# Patient Record
Sex: FEMALE | Race: OTHER | NOT HISPANIC OR LATINO
[De-identification: names, ages, dates, MRNs, and addresses within clinical notes are randomized per-mention and may not be internally consistent; named-entity substitution may affect disease eponyms.]

---

## 2022-03-07 ENCOUNTER — TRANSCRIPTION ENCOUNTER (OUTPATIENT)
Age: 72
End: 2022-03-07

## 2022-03-07 NOTE — ASU PATIENT PROFILE, ADULT - FALL HARM RISK - FACTORS NURSING JUDGEMENT
[email or web address],[email or web address] No

## 2022-03-07 NOTE — ASU PATIENT PROFILE, ADULT - FALL HARM RISK - UNIVERSAL INTERVENTIONS
Bed in lowest position, wheels locked, appropriate side rails in place/Call bell, personal items and telephone in reach/Instruct patient to call for assistance before getting out of bed or chair/Non-slip footwear when patient is out of bed/[place] to call system/Physically safe environment - no spills, clutter or unnecessary equipment/Purposeful Proactive Rounding/Room/bathroom lighting operational, light cord in reach

## 2022-03-08 ENCOUNTER — INPATIENT (INPATIENT)
Facility: HOSPITAL | Age: 72
LOS: 1 days | Discharge: HOME | DRG: 742 | End: 2022-03-10
Attending: OBSTETRICS & GYNECOLOGY | Admitting: OBSTETRICS & GYNECOLOGY
Payer: MEDICARE

## 2022-03-08 ENCOUNTER — RESULT REVIEW (OUTPATIENT)
Age: 72
End: 2022-03-08

## 2022-03-08 VITALS
OXYGEN SATURATION: 98 % | HEIGHT: 62 IN | HEART RATE: 75 BPM | RESPIRATION RATE: 16 BRPM | SYSTOLIC BLOOD PRESSURE: 150 MMHG | WEIGHT: 249.12 LBS | TEMPERATURE: 97 F | DIASTOLIC BLOOD PRESSURE: 87 MMHG

## 2022-03-08 DIAGNOSIS — Z96.653 PRESENCE OF ARTIFICIAL KNEE JOINT, BILATERAL: Chronic | ICD-10-CM

## 2022-03-08 DIAGNOSIS — H26.9 UNSPECIFIED CATARACT: Chronic | ICD-10-CM

## 2022-03-08 LAB
BLD GP AB SCN SERPL QL: NEGATIVE — SIGNIFICANT CHANGE UP
GLUCOSE BLDC GLUCOMTR-MCNC: 122 MG/DL — HIGH (ref 70–99)
RH IG SCN BLD-IMP: POSITIVE — SIGNIFICANT CHANGE UP

## 2022-03-08 PROCEDURE — 88302 TISSUE EXAM BY PATHOLOGIST: CPT | Mod: 26

## 2022-03-08 PROCEDURE — 88307 TISSUE EXAM BY PATHOLOGIST: CPT | Mod: 26

## 2022-03-08 DEVICE — TVT OBTURATOR SYSTEM: Type: IMPLANTABLE DEVICE | Status: FUNCTIONAL

## 2022-03-08 RX ORDER — OXYCODONE HYDROCHLORIDE 5 MG/1
5 TABLET ORAL ONCE
Refills: 0 | Status: DISCONTINUED | OUTPATIENT
Start: 2022-03-08 | End: 2022-03-08

## 2022-03-08 RX ORDER — IBUPROFEN 200 MG
600 TABLET ORAL EVERY 6 HOURS
Refills: 0 | Status: COMPLETED | OUTPATIENT
Start: 2022-03-08

## 2022-03-08 RX ORDER — GABAPENTIN 400 MG/1
0 CAPSULE ORAL
Qty: 0 | Refills: 0 | DISCHARGE

## 2022-03-08 RX ORDER — HEPARIN SODIUM 5000 [USP'U]/ML
5000 INJECTION INTRAVENOUS; SUBCUTANEOUS ONCE
Refills: 0 | Status: COMPLETED | OUTPATIENT
Start: 2022-03-08 | End: 2022-03-08

## 2022-03-08 RX ORDER — CELECOXIB 200 MG/1
400 CAPSULE ORAL ONCE
Refills: 0 | Status: COMPLETED | OUTPATIENT
Start: 2022-03-08 | End: 2022-03-08

## 2022-03-08 RX ORDER — SENNA PLUS 8.6 MG/1
1 TABLET ORAL ONCE
Refills: 0 | Status: DISCONTINUED | OUTPATIENT
Start: 2022-03-08 | End: 2022-03-10

## 2022-03-08 RX ORDER — ACETAMINOPHEN 500 MG
1000 TABLET ORAL ONCE
Refills: 0 | Status: COMPLETED | OUTPATIENT
Start: 2022-03-08 | End: 2022-03-08

## 2022-03-08 RX ORDER — SODIUM CHLORIDE 9 MG/ML
1000 INJECTION, SOLUTION INTRAVENOUS
Refills: 0 | Status: DISCONTINUED | OUTPATIENT
Start: 2022-03-08 | End: 2022-03-09

## 2022-03-08 RX ORDER — BUPIVACAINE 13.3 MG/ML
20 INJECTION, SUSPENSION, LIPOSOMAL INFILTRATION ONCE
Refills: 0 | Status: DISCONTINUED | OUTPATIENT
Start: 2022-03-08 | End: 2022-03-10

## 2022-03-08 RX ORDER — HYDROMORPHONE HYDROCHLORIDE 2 MG/ML
0.5 INJECTION INTRAMUSCULAR; INTRAVENOUS; SUBCUTANEOUS
Refills: 0 | Status: DISCONTINUED | OUTPATIENT
Start: 2022-03-08 | End: 2022-03-08

## 2022-03-08 RX ORDER — ASPIRIN/CALCIUM CARB/MAGNESIUM 324 MG
0 TABLET ORAL
Qty: 0 | Refills: 0 | DISCHARGE

## 2022-03-08 RX ORDER — SIMETHICONE 80 MG/1
80 TABLET, CHEWABLE ORAL ONCE
Refills: 0 | Status: DISCONTINUED | OUTPATIENT
Start: 2022-03-08 | End: 2022-03-10

## 2022-03-08 RX ORDER — HYDROMORPHONE HYDROCHLORIDE 2 MG/ML
0.5 INJECTION INTRAMUSCULAR; INTRAVENOUS; SUBCUTANEOUS ONCE
Refills: 0 | Status: DISCONTINUED | OUTPATIENT
Start: 2022-03-08 | End: 2022-03-08

## 2022-03-08 RX ORDER — ONDANSETRON 8 MG/1
4 TABLET, FILM COATED ORAL EVERY 6 HOURS
Refills: 0 | Status: DISCONTINUED | OUTPATIENT
Start: 2022-03-08 | End: 2022-03-10

## 2022-03-08 RX ORDER — ACETAMINOPHEN 500 MG
1000 TABLET ORAL EVERY 6 HOURS
Refills: 0 | Status: COMPLETED | OUTPATIENT
Start: 2022-03-08

## 2022-03-08 RX ORDER — BENZOCAINE AND MENTHOL 5; 1 G/100ML; G/100ML
1 LIQUID ORAL EVERY 4 HOURS
Refills: 0 | Status: DISCONTINUED | OUTPATIENT
Start: 2022-03-08 | End: 2022-03-10

## 2022-03-08 RX ORDER — ACETAMINOPHEN 500 MG
1000 TABLET ORAL EVERY 6 HOURS
Refills: 0 | Status: COMPLETED | OUTPATIENT
Start: 2022-03-08 | End: 2022-03-09

## 2022-03-08 RX ORDER — KETOROLAC TROMETHAMINE 30 MG/ML
15 SYRINGE (ML) INJECTION EVERY 6 HOURS
Refills: 0 | Status: DISCONTINUED | OUTPATIENT
Start: 2022-03-08 | End: 2022-03-09

## 2022-03-08 RX ADMIN — Medication 1000 MILLIGRAM(S): at 18:29

## 2022-03-08 RX ADMIN — CELECOXIB 400 MILLIGRAM(S): 200 CAPSULE ORAL at 06:54

## 2022-03-08 RX ADMIN — HYDROMORPHONE HYDROCHLORIDE 0.5 MILLIGRAM(S): 2 INJECTION INTRAMUSCULAR; INTRAVENOUS; SUBCUTANEOUS at 14:00

## 2022-03-08 RX ADMIN — Medication 1000 MILLIGRAM(S): at 23:14

## 2022-03-08 RX ADMIN — Medication 15 MILLIGRAM(S): at 18:29

## 2022-03-08 RX ADMIN — HEPARIN SODIUM 5000 UNIT(S): 5000 INJECTION INTRAVENOUS; SUBCUTANEOUS at 06:30

## 2022-03-08 RX ADMIN — HYDROMORPHONE HYDROCHLORIDE 0.5 MILLIGRAM(S): 2 INJECTION INTRAMUSCULAR; INTRAVENOUS; SUBCUTANEOUS at 12:24

## 2022-03-08 RX ADMIN — Medication 15 MILLIGRAM(S): at 17:29

## 2022-03-08 RX ADMIN — BENZOCAINE AND MENTHOL 1 LOZENGE: 5; 1 LIQUID ORAL at 22:29

## 2022-03-08 RX ADMIN — Medication 1000 MILLIGRAM(S): at 06:54

## 2022-03-08 RX ADMIN — HYDROMORPHONE HYDROCHLORIDE 0.5 MILLIGRAM(S): 2 INJECTION INTRAMUSCULAR; INTRAVENOUS; SUBCUTANEOUS at 13:45

## 2022-03-08 RX ADMIN — Medication 1000 MILLIGRAM(S): at 17:29

## 2022-03-08 RX ADMIN — Medication 15 MILLIGRAM(S): at 23:14

## 2022-03-08 RX ADMIN — Medication 1000 MILLIGRAM(S): at 14:00

## 2022-03-08 RX ADMIN — HYDROMORPHONE HYDROCHLORIDE 0.5 MILLIGRAM(S): 2 INJECTION INTRAMUSCULAR; INTRAVENOUS; SUBCUTANEOUS at 12:39

## 2022-03-08 RX ADMIN — Medication 400 MILLIGRAM(S): at 13:45

## 2022-03-08 NOTE — PACU DISCHARGE NOTE - COMMENTS
Patient hemodynamically stable. Pt pain controlled with regimen ordered for. Moving all extremities. Adequate urine output from 16FR nance. Vaginal packing in place, scant bleeding to derick pad. Pt going to tele for inverted t wave in EKG and sleep apnea. Cleared by anesthesiologist to go to floor. Report given to ALBERT Cm.

## 2022-03-08 NOTE — BRIEF OPERATIVE NOTE - OPERATION/FINDINGS
On pelvic exam On pelvic exam 6 week size mobile anteverted uterus palpated with small fibroid palpated, no adnexal masses. TVH completed, tubes and ovaries left, but ovaries not visualized. Anterior repair completed with vicryl sutures. Sacrospinous ligament fixation done. TVOT completed. Hemostasis excellent. Vaginal mucosa reapproximated with 2-0 vicryl. Vaginal packing saoked in betadine placed. Linares left in place

## 2022-03-08 NOTE — BRIEF OPERATIVE NOTE - NSICDXBRIEFPOSTOP_GEN_ALL_CORE_FT
POST-OP DIAGNOSIS:  Cystocele or rectocele with uterine prolapse 08-Mar-2022 08:38:04  Amparo Early

## 2022-03-08 NOTE — PROGRESS NOTE ADULT - SUBJECTIVE AND OBJECTIVE BOX
GYN Post Op Check     Patient seen at bedside. Intermittent inverted T waves noted on TELE.  Patient reports feeling tired but denies any chest pain, SOB, palpitations, dizziness. Endorses slight vaginal pain but well managed with pain medication.   Has not yet attempted PO.   No OOB yet.  Linares in place.    Denies CP, palpitations, SOB, fever, chills, nausea, vomiting.    Vital Signs Last 24 Hrs  T(C): 36.3 (08 Mar 2022 12:00), Max: 36.3 (08 Mar 2022 07:00)  T(F): 97.4 (08 Mar 2022 12:00), Max: 97.4 (08 Mar 2022 12:00)  HR: 81 (08 Mar 2022 14:30) (67 - 90)  BP: 140/71 (08 Mar 2022 14:13) (128/62 - 150/87)  BP(mean): 96 (08 Mar 2022 14:13) (87 - 105)  RR: 10 (08 Mar 2022 14:30) (10 - 18)  SpO2: 100% (08 Mar 2022 14:30) (98% - 100%)    Physical Exam:  Gen: No Acute Distress  Pulm: Clear to auscultation bilaterally  CV: normal s1/s2, RRR, no m/r/g  GI: soft, appropriately tender, nondistended, no rebound, no guarding.  incision c/d/i, dermabond in place  : Linares in place  Ext: SCDs in place, wnl    I&O's Summary    08 Mar 2022 07:01  -  08 Mar 2022 14:48  --------------------------------------------------------  IN: 280 mL / OUT: 175 mL / NET: 105 mL      MEDICATIONS  (STANDING):  acetaminophen     Tablet .. 1000 milliGRAM(s) Oral every 6 hours  BUpivacaine liposome 1.3% Injectable (no eMAR) 20 milliLiter(s) Local Injection Once  HYDROmorphone  Injectable 0.5 milliGRAM(s) IV Push every 15 minutes  ketorolac   Injectable 15 milliGRAM(s) IV Push every 6 hours  lactated ringers. 1000 milliLiter(s) (140 mL/Hr) IV Continuous <Continuous>    MEDICATIONS  (PRN):  acetaminophen     Tablet .. 1000 milliGRAM(s) Oral every 6 hours PRN Mild Pain (1 - 3)  ibuprofen  Tablet. 600 milliGRAM(s) Oral every 6 hours PRN Mild Pain (1 - 3)  ondansetron Injectable 4 milliGRAM(s) IV Push every 6 hours PRN Nausea and/or Vomiting  oxyCODONE    IR 5 milliGRAM(s) Oral Once PRN Moderate Pain (4 - 6)  senna 1 Tablet(s) Oral Once PRN Constipation  simethicone 80 milliGRAM(s) Chew Once PRN Gas    Allergies    No Known Allergies    Intolerances        LABS:

## 2022-03-08 NOTE — BRIEF OPERATIVE NOTE - NSICDXBRIEFPROCEDURE_GEN_ALL_CORE_FT
PROCEDURES:  Hysterectomy, total, vaginal, with salpingo-oophorectomy and combined anteroposterior colporrhaphy 08-Mar-2022 08:37:14  Amparo Early  Exam under anesthesia, pelvic 08-Mar-2022 08:37:34  Amparo Early  Placement, transobturator tape 08-Mar-2022 08:39:01  Amparo Early

## 2022-03-08 NOTE — BRIEF OPERATIVE NOTE - NSICDXBRIEFPREOP_GEN_ALL_CORE_FT
PRE-OP DIAGNOSIS:  Cystocele or rectocele with uterine prolapse 08-Mar-2022 08:37:51  Amparo Early

## 2022-03-08 NOTE — H&P ADULT - HISTORY OF PRESENT ILLNESS
70yo P2 with PMH of HTN and knee OA presenting for scheduled TVH, AP repair and TVTO for uterine prolapse, cystocele, recotele.     OBHx:  x2   Gyn H/x: as above   PMH: HTN  PSH: denies  Meds: HCTZ 25mg, ASA 81mg, gabapentin 300mg q8h   NKDA       T(C): 36.3 (22 @ 07:00), Max: 36.3 (22 @ 07:00)  HR: 75 (22 @ 07:00) (75 - 75)  BP: 150/87 (22 @ 07:00) (150/87 - 150/87)  RR: 16 (22 @ 07:00) (16 - 16)  SpO2: 98% (22 @ 07:00) (98% - 98%)    PHYSICAL EXAM:   Gen: NAD  : EUA in OR pending     LABS:  H 12.2  Cr 0.8

## 2022-03-08 NOTE — H&P ADULT - ASSESSMENT
70yo P2 with PMH of HTN and knee OA presenting for scheduled TVH, AP repair and TVTO for uterine prolapse, cystocele, recotele.   -admit to GYN for scheduled surgery  -ERAS protocol  -anesthesia consult

## 2022-03-09 ENCOUNTER — TRANSCRIPTION ENCOUNTER (OUTPATIENT)
Age: 72
End: 2022-03-09

## 2022-03-09 LAB
ANION GAP SERPL CALC-SCNC: 10 MMOL/L — SIGNIFICANT CHANGE UP (ref 5–17)
BUN SERPL-MCNC: 13 MG/DL — SIGNIFICANT CHANGE UP (ref 7–23)
CALCIUM SERPL-MCNC: 9.1 MG/DL — SIGNIFICANT CHANGE UP (ref 8.4–10.5)
CHLORIDE SERPL-SCNC: 103 MMOL/L — SIGNIFICANT CHANGE UP (ref 96–108)
CO2 SERPL-SCNC: 26 MMOL/L — SIGNIFICANT CHANGE UP (ref 22–31)
CREAT SERPL-MCNC: 0.8 MG/DL — SIGNIFICANT CHANGE UP (ref 0.5–1.3)
EGFR: 80 ML/MIN/1.73M2 — SIGNIFICANT CHANGE UP
GLUCOSE SERPL-MCNC: 136 MG/DL — HIGH (ref 70–99)
HCT VFR BLD CALC: 35.7 % — SIGNIFICANT CHANGE UP (ref 34.5–45)
HGB BLD-MCNC: 11.8 G/DL — SIGNIFICANT CHANGE UP (ref 11.5–15.5)
MAGNESIUM SERPL-MCNC: 1.8 MG/DL — SIGNIFICANT CHANGE UP (ref 1.6–2.6)
MCHC RBC-ENTMCNC: 25.2 PG — LOW (ref 27–34)
MCHC RBC-ENTMCNC: 33.1 GM/DL — SIGNIFICANT CHANGE UP (ref 32–36)
MCV RBC AUTO: 76.3 FL — LOW (ref 80–100)
NRBC # BLD: 0 /100 WBCS — SIGNIFICANT CHANGE UP (ref 0–0)
PHOSPHATE SERPL-MCNC: 3 MG/DL — SIGNIFICANT CHANGE UP (ref 2.5–4.5)
PLATELET # BLD AUTO: 285 K/UL — SIGNIFICANT CHANGE UP (ref 150–400)
POTASSIUM SERPL-MCNC: 4.2 MMOL/L — SIGNIFICANT CHANGE UP (ref 3.5–5.3)
POTASSIUM SERPL-SCNC: 4.2 MMOL/L — SIGNIFICANT CHANGE UP (ref 3.5–5.3)
RBC # BLD: 4.68 M/UL — SIGNIFICANT CHANGE UP (ref 3.8–5.2)
RBC # FLD: 15.8 % — HIGH (ref 10.3–14.5)
SODIUM SERPL-SCNC: 139 MMOL/L — SIGNIFICANT CHANGE UP (ref 135–145)
WBC # BLD: 11.71 K/UL — HIGH (ref 3.8–10.5)
WBC # FLD AUTO: 11.71 K/UL — HIGH (ref 3.8–10.5)

## 2022-03-09 RX ORDER — SODIUM CHLORIDE 9 MG/ML
3 INJECTION INTRAMUSCULAR; INTRAVENOUS; SUBCUTANEOUS EVERY 8 HOURS
Refills: 0 | Status: DISCONTINUED | OUTPATIENT
Start: 2022-03-09 | End: 2022-03-10

## 2022-03-09 RX ORDER — TRAMADOL HYDROCHLORIDE 50 MG/1
50 TABLET ORAL ONCE
Refills: 0 | Status: DISCONTINUED | OUTPATIENT
Start: 2022-03-09 | End: 2022-03-09

## 2022-03-09 RX ORDER — OXYCODONE HYDROCHLORIDE 5 MG/1
5 TABLET ORAL ONCE
Refills: 0 | Status: DISCONTINUED | OUTPATIENT
Start: 2022-03-09 | End: 2022-03-09

## 2022-03-09 RX ORDER — IBUPROFEN 200 MG
600 TABLET ORAL EVERY 6 HOURS
Refills: 0 | Status: DISCONTINUED | OUTPATIENT
Start: 2022-03-09 | End: 2022-03-10

## 2022-03-09 RX ORDER — ACETAMINOPHEN 500 MG
2 TABLET ORAL
Qty: 0 | Refills: 0 | DISCHARGE
Start: 2022-03-09

## 2022-03-09 RX ORDER — MAGNESIUM SULFATE 500 MG/ML
2 VIAL (ML) INJECTION ONCE
Refills: 0 | Status: COMPLETED | OUTPATIENT
Start: 2022-03-09 | End: 2022-03-09

## 2022-03-09 RX ORDER — FAMOTIDINE 10 MG/ML
20 INJECTION INTRAVENOUS ONCE
Refills: 0 | Status: COMPLETED | OUTPATIENT
Start: 2022-03-09 | End: 2022-03-09

## 2022-03-09 RX ADMIN — TRAMADOL HYDROCHLORIDE 50 MILLIGRAM(S): 50 TABLET ORAL at 11:13

## 2022-03-09 RX ADMIN — Medication 1000 MILLIGRAM(S): at 00:14

## 2022-03-09 RX ADMIN — SODIUM CHLORIDE 3 MILLILITER(S): 9 INJECTION INTRAMUSCULAR; INTRAVENOUS; SUBCUTANEOUS at 14:48

## 2022-03-09 RX ADMIN — Medication 1000 MILLIGRAM(S): at 13:43

## 2022-03-09 RX ADMIN — TRAMADOL HYDROCHLORIDE 50 MILLIGRAM(S): 50 TABLET ORAL at 12:43

## 2022-03-09 RX ADMIN — Medication 15 MILLIGRAM(S): at 00:15

## 2022-03-09 RX ADMIN — BENZOCAINE AND MENTHOL 1 LOZENGE: 5; 1 LIQUID ORAL at 07:37

## 2022-03-09 RX ADMIN — Medication 1000 MILLIGRAM(S): at 07:31

## 2022-03-09 RX ADMIN — Medication 15 MILLIGRAM(S): at 13:43

## 2022-03-09 RX ADMIN — Medication 15 MILLIGRAM(S): at 12:31

## 2022-03-09 RX ADMIN — SODIUM CHLORIDE 140 MILLILITER(S): 9 INJECTION, SOLUTION INTRAVENOUS at 01:42

## 2022-03-09 RX ADMIN — Medication 1000 MILLIGRAM(S): at 06:30

## 2022-03-09 RX ADMIN — SODIUM CHLORIDE 3 MILLILITER(S): 9 INJECTION INTRAMUSCULAR; INTRAVENOUS; SUBCUTANEOUS at 21:40

## 2022-03-09 RX ADMIN — Medication 15 MILLIGRAM(S): at 06:30

## 2022-03-09 RX ADMIN — Medication 25 GRAM(S): at 11:14

## 2022-03-09 RX ADMIN — Medication 1000 MILLIGRAM(S): at 12:31

## 2022-03-09 RX ADMIN — Medication 15 MILLIGRAM(S): at 07:31

## 2022-03-09 RX ADMIN — FAMOTIDINE 20 MILLIGRAM(S): 10 INJECTION INTRAVENOUS at 18:35

## 2022-03-09 NOTE — DISCHARGE NOTE PROVIDER - CARE PROVIDER_API CALL
Erendira Patricio)  Obstetrics and Gynecology  [address], [address]  [place], [place] [address]  Phone: [phone]  Fax: [phone]  Follow Up Time:

## 2022-03-09 NOTE — DISCHARGE NOTE PROVIDER - NSDCFUADDINST_GEN_ALL_CORE_FT
- Nothing in vagina - no intercourse, tampons, or douching until cleared by your doctor.   - Avoid swimming, tub baths, strenuous activity and heavy lifting until cleared by your doctor.   - Showering is ok. Pat incisions dry, do not scrub incisions.  - Continue oral pain medications as needed for pain.   - Follow up in office in 1 week on 3/16 for your postoperative visit and for your nance catheter to be removed.  Call the office to confirm your follow up appointment.   - Call the office sooner if you develop severe pain, heavy vaginal bleeding greater than 2 pads in 2 hours, or fevers greater than 100.4 F. Go to the closest emergency room for any of these symptoms if you are not able to contact your doctor. - Nothing in vagina - no intercourse, tampons, or douching until cleared by your doctor.   - Avoid swimming, tub baths, strenuous activity and heavy lifting until cleared by your doctor.   - Showering is ok. Pat incisions dry, do not scrub incisions.  - Continue oral pain medications as needed for pain.   - Follow up in office in 1 week on 3/16 for your postoperative visit. Call the office to confirm your follow up appointment.   - Call the office sooner if you develop severe pain, heavy vaginal bleeding greater than 2 pads in 2 hours, or fevers greater than 100.4 F. Go to the closest emergency room for any of these symptoms if you are not able to contact your doctor.

## 2022-03-09 NOTE — PHYSICAL THERAPY INITIAL EVALUATION ADULT - PERTINENT HX OF CURRENT PROBLEM, REHAB EVAL
72yo P2 with PMH of HTN and knee OA presenting for scheduled TVH, AP repair and TVTO for uterine prolapse, cystocele, recotele.

## 2022-03-09 NOTE — PROGRESS NOTE ADULT - SUBJECTIVE AND OBJECTIVE BOX
SUBJECTIVE: Patient evaluated at bedside. She has no complaints. She reports pain is well controlled with medications. Tolerating regular diet. Feels gas in abdomen but not passing gas. She denies HA, dizziness, SOB, CP, palpitations, n/v, fevers, chills.     OBJECTIVE:  VITALS  T(C): 36.9 (03-09-22 @ 05:26), Max: 37.1 (03-08-22 @ 21:58)  HR: 64 (03-09-22 @ 03:56) (64 - 94)  BP: 131/65 (03-09-22 @ 03:56) (130/61 - 148/78)  RR: 21 (03-09-22 @ 03:56) (18 - 21)  SpO2: 100% (03-09-22 @ 03:56) (98% - 100%)    PHYSICAL EXAM   GA: NAD   Resp: normal respiratory effort  Abd: soft, NT/ND, no rebound or guarding  : packing in place, no vaginal bleeding   Extrem: SCDs in place, no LE edema, no LE tenderness    LABS    03-08 @ 07:01  -  03-09 @ 07:00  --------------------------------------------------------  IN: 2990 mL / OUT: 1975 mL / NET: 1015 mL                  MEDICATIONS  acetaminophen     Tablet .. 1000 milliGRAM(s) Oral every 6 hours  acetaminophen     Tablet .. 1000 milliGRAM(s) Oral every 6 hours PRN  benzocaine 15 mG/menthol 3.6 mG Lozenge 1 Lozenge Oral every 4 hours PRN  BUpivacaine liposome 1.3% Injectable (no eMAR) 20 milliLiter(s) Local Injection Once  ibuprofen  Tablet. 600 milliGRAM(s) Oral every 6 hours PRN  ketorolac   Injectable 15 milliGRAM(s) IV Push every 6 hours  lactated ringers. 1000 milliLiter(s) IV Continuous <Continuous>  ondansetron Injectable 4 milliGRAM(s) IV Push every 6 hours PRN  senna 1 Tablet(s) Oral Once PRN  simethicone 80 milliGRAM(s) Chew Once PRN

## 2022-03-09 NOTE — DISCHARGE NOTE PROVIDER - NSDCCPTREATMENT_GEN_ALL_CORE_FT
PRINCIPAL PROCEDURE  Procedure: Total vaginal hysterectomy with anterior colporrhaphy  Findings and Treatment:

## 2022-03-09 NOTE — PHYSICAL THERAPY INITIAL EVALUATION ADULT - DID THE PATIENT HAVE SURGERY?
Hysterectomy, total, vaginal, with salpingo-oophorectomy and combined anteroposterior colporrhaphy; Placement, transobturator tape/yes

## 2022-03-09 NOTE — DISCHARGE NOTE PROVIDER - NSDCMRMEDTOKEN_GEN_ALL_CORE_FT
acetaminophen 500 mg oral tablet: 2 tab(s) orally every 6 hours, As needed, Mild Pain (1 - 3)  Aleve 220 mg oral tablet: orally prn, As Needed  aspirin 81 mg oral delayed release tablet: orally prn, As Needed  gabapentin 300 mg oral capsule: orally Saturday and Sunday, As Needed  hydroCHLOROthiazide 25 mg oral tablet: 1 tab(s) orally once a day   acetaminophen 500 mg oral tablet: 2 tab(s) orally every 6 hours, As needed, Mild Pain (1 - 3)  Aleve 220 mg oral tablet: orally prn, As Needed  aspirin 81 mg oral delayed release tablet: orally prn, As Needed  gabapentin 300 mg oral capsule: orally Saturday and Sunday, As Needed  hydroCHLOROthiazide 25 mg oral tablet: 1 tab(s) orally once a day  Walker: 1 unit(s) buccal once

## 2022-03-09 NOTE — DISCHARGE NOTE PROVIDER - HOSPITAL COURSE
70yo P2 with PMH of HTN and OA now POD#1 s/p TVH, anterior repair, sacrospinous ligament fixation and TVTO for stage 3 uterine prolapse, cystocele, stress incontinence. On POD#1 patient failed an active TOV, unable to  void after 200cc placed in bladder, nance replaced. PT consulted as patient using a walker to ambulate due to pain in the hospital, does not use walker baseline. Patient tolerating regular diet. Stable for discharge with indwelling nance to be removed in the office on 3/16. 70yo P2 with PMH of HTN and OA now POD#1 s/p TVH, anterior repair, sacrospinous ligament fixation and TVTO for stage 3 uterine prolapse, cystocele, stress incontinence. On POD#1 patient failed an active TOV, unable to  void after 200cc placed in bladder, nance replaced. Active TOV repeated on POD#2 morning, patient passed. PT consulted as patient using a walker to ambulate due to pain in the hospital, does not use walker baseline, per PT to go home with walker. Patient tolerating regular diet. Pain well controlled with tylenol and motrin. Stable for discharge with follow up in the office on 3/16.

## 2022-03-10 ENCOUNTER — TRANSCRIPTION ENCOUNTER (OUTPATIENT)
Age: 72
End: 2022-03-10

## 2022-03-10 VITALS
HEART RATE: 84 BPM | RESPIRATION RATE: 18 BRPM | TEMPERATURE: 98 F | OXYGEN SATURATION: 100 % | DIASTOLIC BLOOD PRESSURE: 72 MMHG | SYSTOLIC BLOOD PRESSURE: 134 MMHG

## 2022-03-10 PROCEDURE — 82962 GLUCOSE BLOOD TEST: CPT

## 2022-03-10 PROCEDURE — C1771: CPT

## 2022-03-10 PROCEDURE — 88302 TISSUE EXAM BY PATHOLOGIST: CPT

## 2022-03-10 PROCEDURE — 86901 BLOOD TYPING SEROLOGIC RH(D): CPT

## 2022-03-10 PROCEDURE — 85027 COMPLETE CBC AUTOMATED: CPT

## 2022-03-10 PROCEDURE — 84100 ASSAY OF PHOSPHORUS: CPT

## 2022-03-10 PROCEDURE — 80048 BASIC METABOLIC PNL TOTAL CA: CPT

## 2022-03-10 PROCEDURE — 83735 ASSAY OF MAGNESIUM: CPT

## 2022-03-10 PROCEDURE — 86900 BLOOD TYPING SEROLOGIC ABO: CPT

## 2022-03-10 PROCEDURE — 36415 COLL VENOUS BLD VENIPUNCTURE: CPT

## 2022-03-10 PROCEDURE — 86850 RBC ANTIBODY SCREEN: CPT

## 2022-03-10 PROCEDURE — 88307 TISSUE EXAM BY PATHOLOGIST: CPT

## 2022-03-10 PROCEDURE — 97161 PT EVAL LOW COMPLEX 20 MIN: CPT

## 2022-03-10 RX ORDER — ENOXAPARIN SODIUM 100 MG/ML
40 INJECTION SUBCUTANEOUS ONCE
Refills: 0 | Status: COMPLETED | OUTPATIENT
Start: 2022-03-10 | End: 2022-03-10

## 2022-03-10 RX ORDER — ACETAMINOPHEN 500 MG
1000 TABLET ORAL EVERY 6 HOURS
Refills: 0 | Status: DISCONTINUED | OUTPATIENT
Start: 2022-03-10 | End: 2022-03-10

## 2022-03-10 RX ADMIN — SODIUM CHLORIDE 3 MILLILITER(S): 9 INJECTION INTRAMUSCULAR; INTRAVENOUS; SUBCUTANEOUS at 11:32

## 2022-03-10 RX ADMIN — ENOXAPARIN SODIUM 40 MILLIGRAM(S): 100 INJECTION SUBCUTANEOUS at 11:37

## 2022-03-10 RX ADMIN — Medication 1000 MILLIGRAM(S): at 13:28

## 2022-03-10 RX ADMIN — SODIUM CHLORIDE 3 MILLILITER(S): 9 INJECTION INTRAMUSCULAR; INTRAVENOUS; SUBCUTANEOUS at 05:47

## 2022-03-10 NOTE — PROGRESS NOTE ADULT - ASSESSMENT
70yo P2 with PMH of HTN and OA now POD#1 s/p TVH, anterior repair, sacrospinous ligament fixation and TVTO for stage 3 uterine prolapse, cystocele, stress incontinence.     Neuro: tylenol/toradol atc, oxy prn   CV: euvolemic; HTN- HCTZ held, inverted T waves and PVCs in PACU, on telemetry   Resp: RA  GI/FEN: reg diet, LR @ 140, Zofran prn, simethicone, protonix  : Linares, active TOV this AM   GYN: vaginal packing x1 in place, taking out this AM  PPX: SCDs, lovneox after 24 h    
70yo P2 with PMH of HTN and OA now POD0 s/p TVH, anterior repair, sacrospinous ligament fixation and TVTO for stage 3 uterine prolapse, cystocele, stress incontinence. evaluated for post op check        Plan:  1. Vital signs stable, continue to monitor per protocol, inverted T waves on tele - f/u stat EKG, admit to telemetry  2. Pain control: tylenol/motrin  3. DVT prophylaxis: SCDs  4. CV: IVF, hx of HTN meds held  5. Pulm: Incentive spirometer (at least 10 times per hour while awake)   6. GI: Diet regular  7. : Linares to stay in place until AM, vaginal packing in place  8. Follow up labs: AM CBC  9. Activity: bedrest tonight     
72yo P2 with PMH of HTN and OA now POD#2 s/p TVH, anterior repair, sacrospinous ligament fixation and TVTO for stage 3 uterine prolapse, cystocele, stress incontinence.     Neuro: tylenol, motrin prn   CV: euvolemic; HTN- HCTZ held, inverted T waves and PVCs in PACU, on telemetry   Resp: RA  GI/FEN: reg diet, hep locked, Zofran prn, simethicone, protonix  : Linares, failed active TOV yesterday, repeat active TOV this AM   GYN: pad counts   PPX: SCDs, lovneox qd

## 2022-03-10 NOTE — DISCHARGE NOTE NURSING/CASE MANAGEMENT/SOCIAL WORK - PATIENT PORTAL LINK FT
You can access the FollowMyHealth Patient Portal offered by [facility] by registering at the following website: [email or web address]. By joining [employer]’s FollowMyHealth portal, you will also be able to view your health information using other applications (apps) compatible with our system.

## 2022-03-10 NOTE — DISCHARGE NOTE NURSING/CASE MANAGEMENT/SOCIAL WORK - NSDCPEFALRISK_GEN_ALL_CORE
For information on Fall & Injury Prevention, visit: [email or web address]  [email or web address] OR  https://www.cdc.gov/steadi/patient.html

## 2022-03-10 NOTE — PROGRESS NOTE ADULT - SUBJECTIVE AND OBJECTIVE BOX
SUBJECTIVE: Patient evaluated at bedside. She has no complaints. She reports pain is well controlled with medications. Ambulating with walker. Tolerating diet. Feels ready to go home. She denies HA, dizziness, SOB, CP, palpitations, n/v, fevers, chills.     OBJECTIVE:  VITALS  T(C): 36.9 (03-10-22 @ 05:29), Max: 36.9 (03-09-22 @ 22:08)  HR: 58 (03-10-22 @ 04:40) (58 - 92)  BP: 130/65 (03-10-22 @ 04:40) (118/58 - 130/65)  RR: 18 (03-10-22 @ 04:40) (18 - 18)  SpO2: 100% (03-10-22 @ 04:40) (100% - 100%)    PHYSICAL EXAM   GA: NAD   Resp: normal respiratory effort  Abd: soft, NT/ND, no rebound or guarding  : no vaginal bleeding, nance in place  Extrem: SCDs in place, no LE edema, no LE tenderness    LABS    03-09 @ 07:01  -  03-10 @ 07:00  --------------------------------------------------------  IN: 480 mL / OUT: 1750 mL / NET: -1270 mL                              11.8   11.71 )-----------( 285      ( 09 Mar 2022 07:34 )             35.7     03-09    139  |  103  |  13  ----------------------------<  136<H>  4.2   |  26  |  0.80    Ca    9.1      09 Mar 2022 07:34  Phos  3.0     03-09  Mg     1.8     03-09        MEDICATIONS  acetaminophen     Tablet .. 1000 milliGRAM(s) Oral every 6 hours PRN  benzocaine 15 mG/menthol 3.6 mG Lozenge 1 Lozenge Oral every 4 hours PRN  BUpivacaine liposome 1.3% Injectable (no eMAR) 20 milliLiter(s) Local Injection Once  ibuprofen  Tablet. 600 milliGRAM(s) Oral every 6 hours PRN  ondansetron Injectable 4 milliGRAM(s) IV Push every 6 hours PRN  senna 1 Tablet(s) Oral Once PRN  simethicone 80 milliGRAM(s) Chew Once PRN  sodium chloride 0.9% lock flush 3 milliLiter(s) IV Push every 8 hours

## 2022-03-17 DIAGNOSIS — N81.3 COMPLETE UTEROVAGINAL PROLAPSE: ICD-10-CM

## 2022-03-17 DIAGNOSIS — G47.30 SLEEP APNEA, UNSPECIFIED: ICD-10-CM

## 2022-03-17 DIAGNOSIS — E66.9 OBESITY, UNSPECIFIED: ICD-10-CM

## 2022-03-17 DIAGNOSIS — I10 ESSENTIAL (PRIMARY) HYPERTENSION: ICD-10-CM

## 2022-03-17 DIAGNOSIS — M17.0 BILATERAL PRIMARY OSTEOARTHRITIS OF KNEE: ICD-10-CM

## 2022-03-17 DIAGNOSIS — N39.3 STRESS INCONTINENCE (FEMALE) (MALE): ICD-10-CM

## 2022-03-25 LAB — SURGICAL PATHOLOGY STUDY: SIGNIFICANT CHANGE UP

## 2022-04-22 PROBLEM — M19.90 UNSPECIFIED OSTEOARTHRITIS, UNSPECIFIED SITE: Chronic | Status: ACTIVE | Noted: 2022-03-07

## 2022-04-22 PROBLEM — I10 ESSENTIAL (PRIMARY) HYPERTENSION: Chronic | Status: ACTIVE | Noted: 2022-03-07

## 2022-04-22 PROBLEM — N81.4 UTEROVAGINAL PROLAPSE, UNSPECIFIED: Chronic | Status: ACTIVE | Noted: 2022-03-07

## 2022-04-26 PROBLEM — Z00.00 ENCOUNTER FOR PREVENTIVE HEALTH EXAMINATION: Status: ACTIVE | Noted: 2022-04-26

## 2022-05-02 ENCOUNTER — APPOINTMENT (OUTPATIENT)
Dept: ORTHOPEDIC SURGERY | Facility: CLINIC | Age: 72
End: 2022-05-02
Payer: MEDICARE

## 2022-05-02 VITALS — WEIGHT: 250 LBS | HEIGHT: 63 IN | BODY MASS INDEX: 44.3 KG/M2

## 2022-05-02 DIAGNOSIS — Z78.9 OTHER SPECIFIED HEALTH STATUS: ICD-10-CM

## 2022-05-02 DIAGNOSIS — Z86.79 PERSONAL HISTORY OF OTHER DISEASES OF THE CIRCULATORY SYSTEM: ICD-10-CM

## 2022-05-02 PROCEDURE — 99204 OFFICE O/P NEW MOD 45 MIN: CPT

## 2022-05-02 RX ORDER — HYDROCHLOROTHIAZIDE 12.5 MG/1
TABLET ORAL
Refills: 0 | Status: ACTIVE | COMMUNITY

## 2022-05-02 RX ORDER — MELOXICAM 15 MG/1
15 TABLET ORAL
Qty: 30 | Refills: 1 | Status: ACTIVE | COMMUNITY
Start: 2022-05-02 | End: 1900-01-01

## 2022-06-27 ENCOUNTER — APPOINTMENT (OUTPATIENT)
Dept: ORTHOPEDIC SURGERY | Facility: CLINIC | Age: 72
End: 2022-06-27
Payer: MEDICARE

## 2022-06-27 VITALS — HEIGHT: 63 IN | WEIGHT: 250 LBS | BODY MASS INDEX: 44.3 KG/M2

## 2022-06-27 PROCEDURE — 20610 DRAIN/INJ JOINT/BURSA W/O US: CPT | Mod: RT

## 2022-06-27 PROCEDURE — 99213 OFFICE O/P EST LOW 20 MIN: CPT | Mod: 25

## 2022-07-07 ENCOUNTER — APPOINTMENT (OUTPATIENT)
Dept: ORTHOPEDIC SURGERY | Facility: CLINIC | Age: 72
End: 2022-07-07

## 2022-07-07 PROCEDURE — 99213 OFFICE O/P EST LOW 20 MIN: CPT

## 2023-02-21 ENCOUNTER — APPOINTMENT (OUTPATIENT)
Dept: ORTHOPEDIC SURGERY | Facility: CLINIC | Age: 73
End: 2023-02-21
Payer: MEDICARE

## 2023-02-23 ENCOUNTER — RESULT REVIEW (OUTPATIENT)
Age: 73
End: 2023-02-23

## 2023-02-23 ENCOUNTER — APPOINTMENT (OUTPATIENT)
Dept: ORTHOPEDIC SURGERY | Facility: CLINIC | Age: 73
End: 2023-02-23
Payer: MEDICARE

## 2023-02-23 ENCOUNTER — OUTPATIENT (OUTPATIENT)
Dept: OUTPATIENT SERVICES | Facility: HOSPITAL | Age: 73
LOS: 1 days | End: 2023-02-23
Payer: MEDICARE

## 2023-02-23 VITALS
DIASTOLIC BLOOD PRESSURE: 94 MMHG | WEIGHT: 250 LBS | HEART RATE: 75 BPM | SYSTOLIC BLOOD PRESSURE: 144 MMHG | HEIGHT: 64 IN | OXYGEN SATURATION: 98 % | BODY MASS INDEX: 42.68 KG/M2

## 2023-02-23 DIAGNOSIS — Z96.653 PRESENCE OF ARTIFICIAL KNEE JOINT, BILATERAL: Chronic | ICD-10-CM

## 2023-02-23 DIAGNOSIS — H26.9 UNSPECIFIED CATARACT: Chronic | ICD-10-CM

## 2023-02-23 DIAGNOSIS — M79.672 PAIN IN LEFT FOOT: ICD-10-CM

## 2023-02-23 PROCEDURE — 73630 X-RAY EXAM OF FOOT: CPT

## 2023-02-23 PROCEDURE — 99213 OFFICE O/P EST LOW 20 MIN: CPT

## 2023-02-23 PROCEDURE — 73630 X-RAY EXAM OF FOOT: CPT | Mod: 26,LT

## 2023-02-23 NOTE — HISTORY OF PRESENT ILLNESS
[de-identified] : LEW GLASS is a 72 year old female w/ OA s/p bilateral TKA (2010, 2011) who presents with left heel pain.\par States the onset of pain was several years ago but with recent exacerbation end of 2022\par No antecedent trauma or injury. Attributes to wearing flat sandals whilst staying in her home [place] of [place] \par Pain is posteromedial aspect of left heel described as 10/10 in intensity and  sharp  in quality.\par Pain is nonradiating.\par Pain is worse with first steps in the morning.\par There is no associated stiffness, numbness, paraesthesia or weakness.\par Exacerbating factors are standing, walking for prolonged periods\par Sketchers arched shoes (some relief), Naproxen (1 tablet daily prescribed by PCP for back and knees)\par Takes Gabapentin daily for paraesthesia in fingers\par Patient ambulates with a cane for last month (since she returned from [place])\par Exercises regularly. \par Has not tried PT\par She sees Dr. Westbrook for right shoulder pain. Had appointment this week that was canceled due to transportation issue. Has rescheduled for next week. Had subacromial CSI 6/2022

## 2023-02-23 NOTE — PHYSICAL EXAM
[de-identified] : \par General: Well-nourished, well-developed, alert, and in no acute distress.\par Head: Normocephalic.\par Eyes: Pupils equal, extraocular muscles intact, normal sclera.\par Nose: No nasal discharge.\par Cardiovascular: Extremities are warm and well perfused. Distal pulses are symmetric bilaterally.\par Respiratory: No labored breathing.\par Extremities: Sensation is intact distally bilaterally. Distal pulses are symmetric bilaterally\par Lymphatic: No regional lymphadenopathy, no lymphedema\par Neurologic: No focal deficits\par Skin: Normal skin color, texture, and turgor\par Psychiatric: Normal affect \par \par Examination of left foot and ankle\par Gait antalgic\par Inspection: No effusion, erythema, hematoma, ecchymosis or edema \par No calluses/corns/blisters/warts/paronychia or subungual hematoma\par No warmth\par Alignment: pes planus, calcaneal supination \par \par Tender to palpation: plantar medial calcaneus at origin of plantar fascia\par Nontender to palpation: medial malleolus, lateral malleolus, base of 5th metatarsal, navicular, ATFL, CFL, PTFL, AITFL, Achilles tendon, PT, FHL, anterior tibilalis, peroneals\par \par ROM: Plantar flexion 45 deg, dorsiflexion 5 deg, inversion 10 deg, eversion 10 deg\par \par Special Tests: \par \par Perez's click negative \par Pain with static stance on forefeet.\par No palpable piezogenic papules \par Mild Hallux valgus deformity \par No Valgus hindfoot deformity \par No Collin's deformity \par Anterior Drawer (ATFL): negative for pain and laxity\par Talar Tilt (CFL): negative for pain and laxity\par Kleigers (ext rot): negative for pain and laxity at deltoid ligament or distal fibula\par Squeeze test: negative at proximal and distal syndesmosis\par Bump test: negative at talar dome, syndesmosis\par Velez test negative\par Dorsiflexion 5 deg with knee extension\par Dorsiflexion 5 deg with knee flexion\par \par Sensation is intact to light touch over the superficial and deep peroneal nerve distributions and the posterior tibial nerve distribution. Capillary refill is less than two seconds. Posterior tibial and dorsalis pedis pulses 2+ equal bilaterally. No calf swelling or tenderness bilaterally. Strength testing shows 5/5 Quads, 5/5 Hamstrings, 5/5 EHL, 5/5 FHL, 5/5 tibialis anterior, 5/5 gastroc-soleus complex. \par Reflexes: Patellar 2+, Achilles 2+, Babinski negative  [de-identified] : XR left foot (2/23/23): No evidence of fracture or deformity. There is slight hallux valgus. There is calcaneal plantar and achilles insertion enthesophytes.

## 2023-02-23 NOTE — ASSESSMENT
[FreeTextEntry1] : LEW GLASS is a 72 year old female with left heel pain.\par I discussed with the patient that their symptoms, signs, and imaging are most consistent with plantar fasciitis. \par We reviewed the natural history of this condition and treatment options ranging from conservative measures (activity modification, physical therapy, icing, oral anti-inflammatory and/or analgesic medications, steroid injection) to surgical management. \par We agreed on the following plan:\par \par \par Activity modification\par Start Home Exercises for plantar fasciitis Demonstration and handout provided. \par Supportive footwear. \par Discussed night splint and heel cups. Patient would like to try both now.\par Medication: increase frequency of Naproxen to BID for 1 week then prn, Increase frequency Gabapentin to TID\par Advanced imaging: XR today. Consider MRI if no improvement\par Consider US guided CSI if no improvement\par Follow up in 6-8 weeks.\par

## 2023-03-02 ENCOUNTER — APPOINTMENT (OUTPATIENT)
Dept: ORTHOPEDIC SURGERY | Facility: CLINIC | Age: 73
End: 2023-03-02
Payer: MEDICARE

## 2023-03-02 VITALS — BODY MASS INDEX: 43.02 KG/M2 | HEIGHT: 64 IN | WEIGHT: 252 LBS

## 2023-03-02 DIAGNOSIS — M54.12 RADICULOPATHY, CERVICAL REGION: ICD-10-CM

## 2023-03-02 PROCEDURE — 99214 OFFICE O/P EST MOD 30 MIN: CPT

## 2023-03-05 PROBLEM — M54.12 CERVICAL RADICULOPATHY: Status: ACTIVE | Noted: 2022-05-02

## 2023-03-27 ENCOUNTER — NON-APPOINTMENT (OUTPATIENT)
Age: 73
End: 2023-03-27

## 2023-03-27 ENCOUNTER — APPOINTMENT (OUTPATIENT)
Dept: OPHTHALMOLOGY | Facility: CLINIC | Age: 73
End: 2023-03-27
Payer: MEDICARE

## 2023-03-27 PROCEDURE — 92004 COMPRE OPH EXAM NEW PT 1/>: CPT

## 2023-04-20 ENCOUNTER — APPOINTMENT (OUTPATIENT)
Dept: OPHTHALMOLOGY | Facility: CLINIC | Age: 73
End: 2023-04-20
Payer: MEDICARE

## 2023-04-20 ENCOUNTER — NON-APPOINTMENT (OUTPATIENT)
Age: 73
End: 2023-04-20

## 2023-04-20 PROCEDURE — 92014 COMPRE OPH EXAM EST PT 1/>: CPT

## 2023-04-20 PROCEDURE — 92060 SENSORIMOTOR EXAMINATION: CPT

## 2023-04-24 ENCOUNTER — OUTPATIENT (OUTPATIENT)
Dept: OUTPATIENT SERVICES | Facility: HOSPITAL | Age: 73
LOS: 1 days | End: 2023-04-24

## 2023-04-24 DIAGNOSIS — H26.9 UNSPECIFIED CATARACT: Chronic | ICD-10-CM

## 2023-04-24 DIAGNOSIS — Z96.653 PRESENCE OF ARTIFICIAL KNEE JOINT, BILATERAL: Chronic | ICD-10-CM

## 2023-04-24 DIAGNOSIS — Z00.00 ENCOUNTER FOR GENERAL ADULT MEDICAL EXAMINATION WITHOUT ABNORMAL FINDINGS: ICD-10-CM

## 2023-04-25 RX ORDER — GABAPENTIN 100 MG/1
100 CAPSULE ORAL 3 TIMES DAILY
Qty: 84 | Refills: 0 | Status: ACTIVE | COMMUNITY
Start: 2023-04-25 | End: 1900-01-01

## 2023-04-25 RX ORDER — METHOCARBAMOL 500 MG/1
500 TABLET, FILM COATED ORAL EVERY 8 HOURS
Qty: 30 | Refills: 0 | Status: ACTIVE | COMMUNITY
Start: 2023-04-25 | End: 1900-01-01

## 2023-04-25 RX ORDER — TRAMADOL HYDROCHLORIDE 50 MG/1
50 TABLET, COATED ORAL EVERY 8 HOURS
Qty: 10 | Refills: 0 | Status: ACTIVE | COMMUNITY
Start: 2023-04-25 | End: 1900-01-01

## 2023-04-25 RX ORDER — ACETAMINOPHEN 500 MG/1
500 TABLET ORAL EVERY 8 HOURS
Qty: 60 | Refills: 0 | Status: ACTIVE | COMMUNITY
Start: 2023-04-25 | End: 1900-01-01

## 2023-04-25 RX ORDER — ONDANSETRON 4 MG/1
4 TABLET, ORALLY DISINTEGRATING ORAL EVERY 8 HOURS
Qty: 10 | Refills: 0 | Status: ACTIVE | COMMUNITY
Start: 2023-04-25 | End: 1900-01-01

## 2023-04-25 RX ORDER — MELOXICAM 15 MG/1
15 TABLET ORAL DAILY
Qty: 15 | Refills: 0 | Status: ACTIVE | COMMUNITY
Start: 2023-04-25 | End: 1900-01-01

## 2023-04-26 ENCOUNTER — APPOINTMENT (OUTPATIENT)
Age: 73
End: 2023-04-26
Payer: MEDICARE

## 2023-04-26 PROCEDURE — 29823 SHO ARTHRS SRG XTNSV DBRDMT: CPT | Mod: RT

## 2023-04-26 PROCEDURE — 29827 SHO ARTHRS SRG RT8TR CUF RPR: CPT | Mod: 22,RT

## 2023-04-26 PROCEDURE — 29826 SHO ARTHRS SRG DECOMPRESSION: CPT | Mod: RT

## 2023-04-26 PROCEDURE — 29824 SHO ARTHRS SRG DSTL CLAVICLC: CPT | Mod: RT

## 2023-05-08 ENCOUNTER — APPOINTMENT (OUTPATIENT)
Dept: ORTHOPEDIC SURGERY | Facility: CLINIC | Age: 73
End: 2023-05-08
Payer: MEDICARE

## 2023-05-08 PROCEDURE — 99024 POSTOP FOLLOW-UP VISIT: CPT

## 2023-05-25 ENCOUNTER — NON-APPOINTMENT (OUTPATIENT)
Age: 73
End: 2023-05-25

## 2023-05-25 ENCOUNTER — APPOINTMENT (OUTPATIENT)
Dept: OPHTHALMOLOGY | Facility: CLINIC | Age: 73
End: 2023-05-25
Payer: MEDICARE

## 2023-05-25 PROCEDURE — 92060 SENSORIMOTOR EXAMINATION: CPT

## 2023-05-25 PROCEDURE — 92014 COMPRE OPH EXAM EST PT 1/>: CPT

## 2023-06-19 ENCOUNTER — APPOINTMENT (OUTPATIENT)
Dept: ORTHOPEDIC SURGERY | Facility: CLINIC | Age: 73
End: 2023-06-19
Payer: MEDICARE

## 2023-06-19 VITALS — HEART RATE: 86 BPM | SYSTOLIC BLOOD PRESSURE: 156 MMHG | DIASTOLIC BLOOD PRESSURE: 91 MMHG | OXYGEN SATURATION: 99 %

## 2023-06-19 DIAGNOSIS — M72.2 PLANTAR FASCIAL FIBROMATOSIS: ICD-10-CM

## 2023-06-19 PROCEDURE — 20551 NJX 1 TENDON ORIGIN/INSJ: CPT | Mod: 79,LT

## 2023-06-19 PROCEDURE — 76942 ECHO GUIDE FOR BIOPSY: CPT

## 2023-06-19 PROCEDURE — 99213 OFFICE O/P EST LOW 20 MIN: CPT | Mod: 25

## 2023-06-19 NOTE — HISTORY OF PRESENT ILLNESS
[de-identified] : LEW GLASS is a 72 year old female presents to follow up left heel pain.\par Last visit was 2/23/23 at which time patient was advised to start home exercises, wear supportive shoes, gel heel cups and try a night splint and take Naproxen.\par Patient is s/p right shoulder rotator cuff repair. \par States pain is only mildly improved. Tried night splint but stopped using as too bulky and uncomfortable. Using gel heel cups with good effect.\par Not performing home exercises, stretching, using frozen water bottle or ball massage.\par Traveling to [place] next month and would like CSI.\par

## 2023-06-19 NOTE — PHYSICAL EXAM
[de-identified] : General: Well-nourished, well-developed, alert, and in no acute distress.\par Head: Normocephalic.\par Eyes: Pupils equal, extraocular muscles intact, normal sclera.\par Nose: No nasal discharge.\par Cardiovascular: Extremities are warm and well perfused. Distal pulses are symmetric bilaterally.\par Respiratory: No labored breathing.\par Extremities: Sensation is intact distally bilaterally. Distal pulses are symmetric bilaterally\par Lymphatic: No regional lymphadenopathy, no lymphedema\par Neurologic: No focal deficits\par Skin: Normal skin color, texture, and turgor\par Psychiatric: Normal affect \par \par MSK:\par Examination of left foot and ankle\par Gait normal, non-antalgic\par Ambulating independently \par Inspection: No erythema, hematoma, ecchymosis or edema \par No calluses/corns/blisters/warts/paronychia or subungual hematoma\par No warmth\par \par Tender to palpation: calcaneus at plantar fascia origin\par Nontender to palpation: medial malleolus, lateral malleolus, base of 5th metatarsal, navicular, ATFL, CFL, PTFL, AITFL, Achilles tendon, PT, FHL, anterior tibialis, peroneals\par \par ROM: Plantar flexion 45 deg, dorsiflexion 00 deg, inversion 20 deg, eversion 30 deg\par \par Special Tests: \par \par Perez's click negative \par No pain with static stance on forefeet.\par No palpable piezogenic papules \par No Hallux valgus deformity \par No Valgus hindfoot deformity \par No Collin's deformity \par Anterior Drawer (ATFL): negative for pain and laxity\par Talar Tilt (CFL): negative for pain and laxity\par Kleigers (ext rot): negative for pain and laxity at deltoid ligament or distal fibula\par Squeeze test: negative at proximal and distal syndesmosis\par Tinel's at tarsal tunnel negative\par Velez test negative\par \par \par Sensation is intact to light touch over the superficial and deep peroneal nerve distributions and the posterior tibial nerve distribution. Capillary refill is less than two seconds. Posterior tibial and dorsalis pedis pulses 2+ equal bilaterally. No calf swelling or tenderness bilaterally. Strength testing shows  Hip flexion 5/5, Hip abduction 5/5, Hip abduction 5/5, Knee Extension 5/5, Knee Flexion 5/5, dorsiflexion 5/5, plantar flexion 5/5, EHL 5/5\par Reflexes: Patellar 2+, Achilles 2+, Babinski negative

## 2023-06-19 NOTE — ASSESSMENT
[FreeTextEntry1] : LEW GLASS is a 72 year old female with left heel pain.    \par I discussed with the patient that their symptoms, signs, and imaging are most consistent with plantar fasciitis.\par We reviewed the natural history of this condition and treatment options.\par We agreed on the following plan:\par \par US guided CSI as detailed above.\par Encouraged to continue home exercises per handout. Daily early morning stretches demonstrated again.  \par Continue with supportive shoes and gel heel cups.\par Recommend 150 min per week of moderate intensity aerobic activity \par Medication: Acetaminophen prn\par Follow up in 6-8 weeks.

## 2023-06-19 NOTE — PROCEDURE
[de-identified] : Ultrasound-guided plantar fascia steroid injection of the left foot:\par \par The plantar fascia origin at the plantar medial calcaneus was identified with Sonosite US 19 MHz transducer. Following a discussion of the risks (bleeding, infection, lack of response) and benefits, verbal consent was obtained. Patient placed in supine position.The area was anaesthetised with ethyl chloride spray then prepped with chlorhexadine. \par Under strict sterile technique 25 G needle was inserted with needle location confirmed on US. After negative aspiration, a mixture of  2 mL of 0.5% Bupivacaine without epinephrine, 1mL of 1% lidocaine without epinephrine, and 1 mL of 40 mg/ml triamcinolone was injected into the plantar fascia origin. Upon removal of the needle a sterile bandage was applied.\par \par The use of direct ultrasound visualization was necessary to increase patient safety by identifying and avoiding inadvertent needle placement within the neurovascular and osteochondral structures. Additionally, the increased accuracy of needle placement may improve therapeutic efficacy and allow higher diagnostic specificity when evaluating the effectiveness of this injection.\par \par The patient tolerated the procedure well and was provided with post injection instructions (no vigorous activity, ice intermittently for 48 hrs). Patient verbalized understanding.

## 2023-07-06 ENCOUNTER — APPOINTMENT (OUTPATIENT)
Dept: ORTHOPEDIC SURGERY | Facility: CLINIC | Age: 73
End: 2023-07-06
Payer: MEDICARE

## 2023-07-06 DIAGNOSIS — M25.511 PAIN IN RIGHT SHOULDER: ICD-10-CM

## 2023-07-06 DIAGNOSIS — M67.819 OTHER SPECIFIED DISORDERS OF SYNOVIUM AND TENDON, UNSPECIFIED SHOULDER: ICD-10-CM

## 2023-07-06 PROCEDURE — 99024 POSTOP FOLLOW-UP VISIT: CPT

## 2023-07-13 ENCOUNTER — APPOINTMENT (OUTPATIENT)
Dept: OPHTHALMOLOGY | Facility: CLINIC | Age: 73
End: 2023-07-13

## 2023-10-06 NOTE — DISCHARGE NOTE PROVIDER - NSDCCPCAREPLAN_GEN_ALL_CORE_FT
Delay Time (Ms): 20 PRINCIPAL DISCHARGE DIAGNOSIS  Diagnosis: Uterine prolapse  Assessment and Plan of Treatment:

## 2024-05-21 ENCOUNTER — APPOINTMENT (OUTPATIENT)
Dept: ORTHOPEDIC SURGERY | Facility: CLINIC | Age: 74
End: 2024-05-21
Payer: MEDICARE

## 2024-05-21 DIAGNOSIS — M75.111 INCOMPLETE ROTATOR CUFF TEAR OR RUPTURE OF RIGHT SHOULDER, NOT SPECIFIED AS TRAUMATIC: ICD-10-CM

## 2024-05-21 PROCEDURE — 99213 OFFICE O/P EST LOW 20 MIN: CPT

## 2024-06-05 ENCOUNTER — APPOINTMENT (OUTPATIENT)
Dept: OTOLARYNGOLOGY | Facility: CLINIC | Age: 74
End: 2024-06-05
Payer: MEDICARE

## 2024-06-05 VITALS
WEIGHT: 252 LBS | TEMPERATURE: 96.8 F | BODY MASS INDEX: 43.02 KG/M2 | HEIGHT: 64 IN | HEART RATE: 72 BPM | DIASTOLIC BLOOD PRESSURE: 82 MMHG | OXYGEN SATURATION: 97 % | SYSTOLIC BLOOD PRESSURE: 147 MMHG

## 2024-06-05 DIAGNOSIS — R22.1 LOCALIZED SWELLING, MASS AND LUMP, NECK: ICD-10-CM

## 2024-06-05 PROCEDURE — 99204 OFFICE O/P NEW MOD 45 MIN: CPT | Mod: 25

## 2024-06-05 PROCEDURE — 31231 NASAL ENDOSCOPY DX: CPT

## 2024-06-05 RX ORDER — FLUTICASONE PROPIONATE 50 UG/1
50 SPRAY, METERED NASAL TWICE DAILY
Qty: 1 | Refills: 2 | Status: ACTIVE | COMMUNITY
Start: 2024-06-05 | End: 1900-01-01

## 2024-06-05 RX ORDER — PREDNISONE 10 MG/1
10 TABLET ORAL
Qty: 30 | Refills: 0 | Status: ACTIVE | COMMUNITY
Start: 2024-06-05 | End: 1900-01-01

## 2024-06-13 ENCOUNTER — OUTPATIENT (OUTPATIENT)
Dept: OUTPATIENT SERVICES | Facility: HOSPITAL | Age: 74
LOS: 1 days | End: 2024-06-13

## 2024-06-13 ENCOUNTER — APPOINTMENT (OUTPATIENT)
Dept: CT IMAGING | Facility: CLINIC | Age: 74
End: 2024-06-13
Payer: MEDICARE

## 2024-06-13 DIAGNOSIS — Z96.653 PRESENCE OF ARTIFICIAL KNEE JOINT, BILATERAL: Chronic | ICD-10-CM

## 2024-06-13 DIAGNOSIS — H26.9 UNSPECIFIED CATARACT: Chronic | ICD-10-CM

## 2024-06-13 PROCEDURE — 70491 CT SOFT TISSUE NECK W/DYE: CPT | Mod: 26

## 2024-06-14 ENCOUNTER — APPOINTMENT (OUTPATIENT)
Dept: OTOLARYNGOLOGY | Facility: CLINIC | Age: 74
End: 2024-06-14
Payer: MEDICARE

## 2024-06-14 DIAGNOSIS — R43.9 UNSPECIFIED DISTURBANCES OF SMELL AND TASTE: ICD-10-CM

## 2024-06-14 PROCEDURE — 99213 OFFICE O/P EST LOW 20 MIN: CPT

## 2024-06-14 PROCEDURE — 99443: CPT

## 2024-06-15 PROBLEM — R43.9 SMELL DISORDER: Status: ACTIVE | Noted: 2024-06-05

## 2024-06-15 NOTE — HISTORY OF PRESENT ILLNESS
[de-identified] : CC: hyposmia   HISTORY OF PRESENT ILLNESS: Ms. Driver is a pleasant 73 year old lady with hyposmia referred by Dr. Leo Westbrook from Orthopedics several months ago she developed significant hyposmia but taste is intact she does not recall if she had a significant episode of URI she received all of her COVID vaccines and never tested positive no significant nasal obstruction or classic allergies no significant history of head trauma has not tried any particular remedies other than smell training of note, during the exam she was found to have a pulsatile lesion in the right side of her neck painless, denies dyspnea dysphagia or dysphonia  s/p CT neck only showed severely dilated right IJ, at least twice the size of left IJ unclear if she had JVD but she denied hx of cardiac disease, CHF or pulm/renal disease she took prednisone but smell has not returned CT neck covered the sinuses which are clear   REVIEW OF SYSTEMS: General ROS: negative for - chills, fatigue or fever Psychological ROS: negative for - anxiety or depression Ophthalmic ROS: negative for - blurry vision, decreased vision or double vision ENT ROS: negative except as noted from HPI Allergy and Immunology ROS: negative except as noted from HPI Hematological and Lymphatic ROS: negative for - bleeding problems Endocrine ROS: negative for - malaise/lethargy Respiratory ROS: negative for - stridor Cardiovascular ROS: negative for - chest pain Gastrointestinal ROS: negative for - appetite loss or nausea/vomiting Genitourinary ROS: negative for - incontinence Musculoskeletal ROS: negative for - gait disturbance Neurological ROS: negative for - behavioral changes Dermatological ROS: negative for - nail changes   IMPRESSION: Ms. Driver is a pleasant 73 year old lady with hyposmia, dilated right IJ   PLAN: -referral to Dr. Gilberto Rodriguez from Cardiology for further evaluation -trial of smell training   Kenny Chavez MD [facility] Rhinology and Skull Base Surgery Department of Otolaryngology- Head and Neck Surgery [facility]

## 2024-07-02 ENCOUNTER — NON-APPOINTMENT (OUTPATIENT)
Age: 74
End: 2024-07-02

## 2024-07-02 ENCOUNTER — APPOINTMENT (OUTPATIENT)
Dept: HEART AND VASCULAR | Facility: CLINIC | Age: 74
End: 2024-07-02
Payer: MEDICARE

## 2024-07-02 VITALS
WEIGHT: 250.03 LBS | HEART RATE: 88 BPM | OXYGEN SATURATION: 99 % | TEMPERATURE: 98 F | HEIGHT: 64 IN | SYSTOLIC BLOOD PRESSURE: 146 MMHG | BODY MASS INDEX: 42.69 KG/M2 | DIASTOLIC BLOOD PRESSURE: 74 MMHG

## 2024-07-02 DIAGNOSIS — R22.1 LOCALIZED SWELLING, MASS AND LUMP, NECK: ICD-10-CM

## 2024-07-02 DIAGNOSIS — R09.89 OTHER SPECIFIED SYMPTOMS AND SIGNS INVOLVING THE CIRCULATORY AND RESPIRATORY SYSTEMS: ICD-10-CM

## 2024-07-02 PROCEDURE — 93000 ELECTROCARDIOGRAM COMPLETE: CPT

## 2024-07-02 PROCEDURE — 99204 OFFICE O/P NEW MOD 45 MIN: CPT | Mod: 25

## 2024-07-03 LAB — NT-PROBNP SERPL-MCNC: <36 PG/ML

## 2024-07-07 ENCOUNTER — OUTPATIENT (OUTPATIENT)
Dept: OUTPATIENT SERVICES | Facility: HOSPITAL | Age: 74
LOS: 1 days | End: 2024-07-07
Payer: MEDICARE

## 2024-07-07 DIAGNOSIS — Z96.653 PRESENCE OF ARTIFICIAL KNEE JOINT, BILATERAL: Chronic | ICD-10-CM

## 2024-07-07 DIAGNOSIS — H26.9 UNSPECIFIED CATARACT: Chronic | ICD-10-CM

## 2024-07-07 PROCEDURE — 93970 EXTREMITY STUDY: CPT

## 2024-07-13 ENCOUNTER — OUTPATIENT (OUTPATIENT)
Dept: OUTPATIENT SERVICES | Facility: HOSPITAL | Age: 74
LOS: 1 days | End: 2024-07-13
Payer: MEDICARE

## 2024-07-13 ENCOUNTER — RESULT REVIEW (OUTPATIENT)
Age: 74
End: 2024-07-13

## 2024-07-13 ENCOUNTER — APPOINTMENT (OUTPATIENT)
Dept: CT IMAGING | Facility: HOSPITAL | Age: 74
End: 2024-07-13

## 2024-07-13 DIAGNOSIS — Z96.653 PRESENCE OF ARTIFICIAL KNEE JOINT, BILATERAL: Chronic | ICD-10-CM

## 2024-07-13 DIAGNOSIS — H26.9 UNSPECIFIED CATARACT: Chronic | ICD-10-CM

## 2024-07-13 LAB — POCT ISTAT CREATININE: 0.8 MG/DL — SIGNIFICANT CHANGE UP (ref 0.5–1.3)

## 2024-07-13 PROCEDURE — 71260 CT THORAX DX C+: CPT | Mod: 26

## 2024-07-13 PROCEDURE — 71260 CT THORAX DX C+: CPT

## 2024-07-13 PROCEDURE — 82565 ASSAY OF CREATININE: CPT

## 2024-07-16 ENCOUNTER — APPOINTMENT (OUTPATIENT)
Dept: HEART AND VASCULAR | Facility: CLINIC | Age: 74
End: 2024-07-16
Payer: MEDICARE

## 2024-07-16 VITALS
TEMPERATURE: 98.2 F | HEIGHT: 64 IN | OXYGEN SATURATION: 98 % | WEIGHT: 253 LBS | SYSTOLIC BLOOD PRESSURE: 142 MMHG | BODY MASS INDEX: 43.19 KG/M2 | HEART RATE: 74 BPM | DIASTOLIC BLOOD PRESSURE: 80 MMHG

## 2024-07-16 PROCEDURE — 99214 OFFICE O/P EST MOD 30 MIN: CPT

## (undated) DEVICE — VENODYNE/SCD SLEEVE CALF MEDIUM

## (undated) DEVICE — VAGINAL PACKING 2"

## (undated) DEVICE — FOLEY TRAY 16FR 5CC LF UMETER CLOSED

## (undated) DEVICE — Device

## (undated) DEVICE — SUT VICRYL PLUS 0 36" CT-1

## (undated) DEVICE — DRAPE LAVH 124" X 30" X125"

## (undated) DEVICE — SUT VICRYL 0 18" CT-1 UNDYED (POP-OFF)

## (undated) DEVICE — SUT CAPIO SLIM CAPTURING DEVICE

## (undated) DEVICE — DRAPE TOWEL BLUE 17" X 24"

## (undated) DEVICE — SOL IRR BAG NS 0.9% 3000ML

## (undated) DEVICE — PACK GENERAL MINOR

## (undated) DEVICE — LUBRICATING JELLY ONESHOT 1.25OZ

## (undated) DEVICE — PREP BETADINE SPONGE STICKS

## (undated) DEVICE — WARMING BLANKET UPPER ADULT

## (undated) DEVICE — SEALER TISSUE ENSEAL CURVED X1 LG 13MM

## (undated) DEVICE — SUT VICRYL 2-0 27" CT-1 UNDYED

## (undated) DEVICE — SUT CAPIO SLIM SNGL

## (undated) DEVICE — GLV 8 PROTEXIS (WHITE)

## (undated) DEVICE — SUT VICRYL 0 18" TIES